# Patient Record
Sex: MALE | Race: WHITE
[De-identification: names, ages, dates, MRNs, and addresses within clinical notes are randomized per-mention and may not be internally consistent; named-entity substitution may affect disease eponyms.]

---

## 2023-08-04 PROBLEM — Z00.00 ENCOUNTER FOR PREVENTIVE HEALTH EXAMINATION: Status: ACTIVE | Noted: 2023-08-04

## 2023-08-08 ENCOUNTER — APPOINTMENT (OUTPATIENT)
Age: 33
End: 2023-08-08
Payer: COMMERCIAL

## 2023-08-08 VITALS
WEIGHT: 240 LBS | HEIGHT: 76 IN | DIASTOLIC BLOOD PRESSURE: 89 MMHG | BODY MASS INDEX: 29.22 KG/M2 | SYSTOLIC BLOOD PRESSURE: 142 MMHG | HEART RATE: 77 BPM

## 2023-08-08 DIAGNOSIS — Z78.9 OTHER SPECIFIED HEALTH STATUS: ICD-10-CM

## 2023-08-08 DIAGNOSIS — K40.90 UNILATERAL INGUINAL HERNIA, W/OUT OBSTRUCTION OR GANGRENE, NOT SPECIFIED AS RECURRENT: ICD-10-CM

## 2023-08-08 DIAGNOSIS — Z80.9 FAMILY HISTORY OF MALIGNANT NEOPLASM, UNSPECIFIED: ICD-10-CM

## 2023-08-08 PROCEDURE — 99203 OFFICE O/P NEW LOW 30 MIN: CPT

## 2023-08-10 LAB
ALBUMIN SERPL ELPH-MCNC: 4.8 G/DL
ALP BLD-CCNC: 50 U/L
ALT SERPL-CCNC: 39 U/L
ANION GAP SERPL CALC-SCNC: 20 MMOL/L
APTT BLD: 32.5 SEC
AST SERPL-CCNC: 21 U/L
BILIRUB SERPL-MCNC: 0.3 MG/DL
BUN SERPL-MCNC: 15 MG/DL
CALCIUM SERPL-MCNC: 10.1 MG/DL
CHLORIDE SERPL-SCNC: 100 MMOL/L
CO2 SERPL-SCNC: 23 MMOL/L
CREAT SERPL-MCNC: 0.94 MG/DL
EGFR: 110 ML/MIN/1.73M2
GLUCOSE SERPL-MCNC: 51 MG/DL
INR PPP: 0.95 RATIO
POTASSIUM SERPL-SCNC: 4.5 MMOL/L
PROT SERPL-MCNC: 7 G/DL
PT BLD: 10.7 SEC
SODIUM SERPL-SCNC: 143 MMOL/L

## 2023-08-14 PROBLEM — Z78.9 RARELY CONSUMES ALCOHOL: Status: ACTIVE | Noted: 2023-08-08

## 2023-08-14 NOTE — PHYSICAL EXAM
[Exam Deferred] : exam was deferred [Normal Breath Sounds] : Normal breath sounds [Normal Heart Sounds] : normal heart sounds [Normal Rate and Rhythm] : normal rate and rhythm [Alert] : alert [Oriented to Person] : oriented to person [Oriented to Place] : oriented to place [Oriented to Time] : oriented to time [de-identified] : Left inguinal hernia palpated, unable to palpate inguinal hernia on right side.  [JVD] : no jugular venous distention  [Wheezing] : no wheezing was heard [No Rash or Lesion] : No rash or lesion [de-identified] : soft, non-distended, non-tender to palpation.  [de-identified] : awake, alert, NAD  [de-identified] : normocephalic, atraumatic, EOMI, normal conjunctiva  [de-identified] : b/l chest rise, EWOB on RA  [de-identified] : Left inguinal bulge and hernia palpated, no palpable R inguinal hernia on coughing [de-identified] : Normal strength  [de-identified] : normal mood and affect

## 2023-08-14 NOTE — ASSESSMENT
[FreeTextEntry1] : Mr. RILEY ZUÑIGA is a healthy 33 year M presenting with left inguinal hernia here for an initial visit.

## 2023-08-14 NOTE — REVIEW OF SYSTEMS
[As Noted in HPI] : as noted in HPI [Negative] : Integumentary [Fever] : no fever [Chills] : no chills [Feeling Poorly] : not feeling poorly [Feeling Tired] : not feeling tired [Recent Weight Loss (___ Lbs)] : no recent weight loss [Eye Pain] : no eye pain [Earache] : no earache [Chest Pain] : no chest pain [Shortness Of Breath] : no shortness of breath [Abdominal Pain] : no abdominal pain [Vomiting] : no vomiting [Constipation] : no constipation [Diarrhea] : no diarrhea [FreeTextEntry7] : left inguinal hernia

## 2023-08-14 NOTE — HISTORY OF PRESENT ILLNESS
[FreeTextEntry1] : Mr. RILEY ZUÑIGA is a healthy 33 year M presenting with left inguinal hernia here for an initial visit. Patient works in construction which involve heavy lifting. He first noticed the inguinal hernia about 1yr ago and states it comes and goes. However, a couple of weeks ago, for the first time he noticed pain with it bulging but the pain resolved.  Per patient, hernia has not grown in size. Denies abdominal pain, nausea, vomiting blood in stools, dysuria, fevers/chills or skin changes. Has never had a colonoscopy.

## 2023-08-16 ENCOUNTER — OUTPATIENT (OUTPATIENT)
Dept: OUTPATIENT SERVICES | Facility: HOSPITAL | Age: 33
LOS: 1 days | End: 2023-08-16
Payer: COMMERCIAL

## 2023-08-16 VITALS
HEART RATE: 81 BPM | TEMPERATURE: 97 F | RESPIRATION RATE: 18 BRPM | OXYGEN SATURATION: 100 % | SYSTOLIC BLOOD PRESSURE: 137 MMHG | WEIGHT: 240.08 LBS | DIASTOLIC BLOOD PRESSURE: 80 MMHG | HEIGHT: 76 IN

## 2023-08-16 DIAGNOSIS — K40.90 UNILATERAL INGUINAL HERNIA, WITHOUT OBSTRUCTION OR GANGRENE, NOT SPECIFIED AS RECURRENT: ICD-10-CM

## 2023-08-16 DIAGNOSIS — Z87.81 PERSONAL HISTORY OF (HEALED) TRAUMATIC FRACTURE: Chronic | ICD-10-CM

## 2023-08-16 DIAGNOSIS — Z01.818 ENCOUNTER FOR OTHER PREPROCEDURAL EXAMINATION: ICD-10-CM

## 2023-08-16 RX ORDER — SODIUM CHLORIDE 9 MG/ML
3 INJECTION INTRAMUSCULAR; INTRAVENOUS; SUBCUTANEOUS EVERY 8 HOURS
Refills: 0 | Status: DISCONTINUED | OUTPATIENT
Start: 2023-08-21 | End: 2023-08-21

## 2023-08-16 NOTE — H&P PST ADULT - NS ATTEND AMEND GEN_ALL_CORE FT
OR today for open left inguinal hernia repair with mesh  Left side marked  ancef for abx  supine w/ arms out  general anesthesia  0.5% marcaine and exparel 1:1    Delaney Chapman MD  Attending Physician

## 2023-08-16 NOTE — H&P PST ADULT - PROBLEM SELECTOR PLAN 1
Patient scheduled for left inguinal hernia repair with mesh on 8/21/23. Preop instructions given both verbal and written,  instructed to be NPO the night before and the morning of surgery. Provided with chlorhexidine 4% solution to wash for 3 days including the morning of surgery, bottle provided .Instructed to avoid aspirin and over the counter medications including vitamins and herbal medications one week before surgery. Patient verbalized understanding. Escort required  Stop bang score is 1 , patient low risk for MANDEEP.  Blood work and EKG results pending, obtain from a PCP please.

## 2023-08-16 NOTE — H&P PST ADULT - ASSESSMENT
33 years old male with no significant PMH presents to [facility] today for presurgical evaluation. Patient with left inguinal hernia scheduled for repair with mesh on 8/21/23. Pat seen by a PCP on 7/31/23, blood work and EKG done.

## 2023-08-16 NOTE — H&P PST ADULT - PROBLEM/PLAN-1
Refill request from the patient for Voltaren Gel     Last office visit: 3/20/18  Last refill: 9-12-17      Medication and pharmacy loaded pending MD approval.   Please verify medication and dosage  Ok to refill? Please advise.     DISPLAY PLAN FREE TEXT

## 2023-08-16 NOTE — H&P PST ADULT - HISTORY OF PRESENT ILLNESS
33 years old male with no significant PMH presents to [facility] today for presurgical evaluation. Patient with left inguinal hernia scheduled for repair with mesh on 8/21/23.

## 2023-08-17 PROCEDURE — G0463: CPT

## 2023-08-20 ENCOUNTER — TRANSCRIPTION ENCOUNTER (OUTPATIENT)
Age: 33
End: 2023-08-20

## 2023-08-21 ENCOUNTER — TRANSCRIPTION ENCOUNTER (OUTPATIENT)
Age: 33
End: 2023-08-21

## 2023-08-21 ENCOUNTER — APPOINTMENT (OUTPATIENT)
Dept: SURGERY | Facility: HOSPITAL | Age: 33
End: 2023-08-21
Payer: COMMERCIAL

## 2023-08-21 ENCOUNTER — RESULT REVIEW (OUTPATIENT)
Age: 33
End: 2023-08-21

## 2023-08-21 ENCOUNTER — OUTPATIENT (OUTPATIENT)
Dept: OUTPATIENT SERVICES | Facility: HOSPITAL | Age: 33
LOS: 1 days | End: 2023-08-21
Payer: COMMERCIAL

## 2023-08-21 VITALS
HEART RATE: 63 BPM | SYSTOLIC BLOOD PRESSURE: 109 MMHG | DIASTOLIC BLOOD PRESSURE: 62 MMHG | RESPIRATION RATE: 16 BRPM | OXYGEN SATURATION: 96 % | TEMPERATURE: 98 F

## 2023-08-21 VITALS
HEIGHT: 76 IN | TEMPERATURE: 98 F | OXYGEN SATURATION: 98 % | WEIGHT: 240.08 LBS | HEART RATE: 76 BPM | SYSTOLIC BLOOD PRESSURE: 130 MMHG | RESPIRATION RATE: 16 BRPM | DIASTOLIC BLOOD PRESSURE: 85 MMHG

## 2023-08-21 DIAGNOSIS — K40.90 UNILATERAL INGUINAL HERNIA, WITHOUT OBSTRUCTION OR GANGRENE, NOT SPECIFIED AS RECURRENT: ICD-10-CM

## 2023-08-21 DIAGNOSIS — Z87.81 PERSONAL HISTORY OF (HEALED) TRAUMATIC FRACTURE: Chronic | ICD-10-CM

## 2023-08-21 PROCEDURE — 49505 PRP I/HERN INIT REDUC >5 YR: CPT | Mod: LT

## 2023-08-21 PROCEDURE — 88302 TISSUE EXAM BY PATHOLOGIST: CPT

## 2023-08-21 PROCEDURE — C1781: CPT

## 2023-08-21 PROCEDURE — 49505 PRP I/HERN INIT REDUC >5 YR: CPT | Mod: AS,LT

## 2023-08-21 PROCEDURE — 88302 TISSUE EXAM BY PATHOLOGIST: CPT | Mod: 26

## 2023-08-21 DEVICE — MESH HERNIA PERFIX PLUG MEDIUM 1.3 X 1.55": Type: IMPLANTABLE DEVICE | Site: LEFT INGUINAL HERNIA REPAIR WITH MESH | Status: FUNCTIONAL

## 2023-08-21 RX ORDER — OXYCODONE HYDROCHLORIDE 5 MG/1
1 TABLET ORAL
Qty: 5 | Refills: 0
Start: 2023-08-21

## 2023-08-21 RX ORDER — FENTANYL CITRATE 50 UG/ML
25 INJECTION INTRAVENOUS
Refills: 0 | Status: DISCONTINUED | OUTPATIENT
Start: 2023-08-21 | End: 2023-08-21

## 2023-08-21 RX ORDER — OXYCODONE HYDROCHLORIDE 5 MG/1
5 TABLET ORAL EVERY 4 HOURS
Refills: 0 | Status: DISCONTINUED | OUTPATIENT
Start: 2023-08-21 | End: 2023-08-21

## 2023-08-21 RX ORDER — METOCLOPRAMIDE HCL 10 MG
10 TABLET ORAL ONCE
Refills: 0 | Status: DISCONTINUED | OUTPATIENT
Start: 2023-08-21 | End: 2023-08-21

## 2023-08-21 RX ORDER — FENTANYL CITRATE 50 UG/ML
50 INJECTION INTRAVENOUS
Refills: 0 | Status: DISCONTINUED | OUTPATIENT
Start: 2023-08-21 | End: 2023-08-21

## 2023-08-21 RX ADMIN — SODIUM CHLORIDE 3 MILLILITER(S): 9 INJECTION INTRAMUSCULAR; INTRAVENOUS; SUBCUTANEOUS at 09:15

## 2023-08-21 NOTE — ASU DISCHARGE PLAN (ADULT/PEDIATRIC) - NS MD DC FALL RISK RISK
For information on Fall & Injury Prevention, visit: [email or web address] OR  [email or web address] OR  https://www.cdc.gov/steadi/patient.html

## 2023-08-21 NOTE — ASU DISCHARGE PLAN (ADULT/PEDIATRIC) - CALL YOUR DOCTOR IF YOU HAVE ANY OF THE FOLLOWING:
Fever greater than (need to indicate Fahrenheit or Celsius) Bleeding that does not stop/Fever greater than (need to indicate Fahrenheit or Celsius)/Wound/Surgical Site with redness, or foul smelling discharge or pus

## 2023-08-21 NOTE — ASU DISCHARGE PLAN (ADULT/PEDIATRIC) - CARE PROVIDER_API CALL
Delaney Chapman  Colon/Rectal Surgery  [address] [place] [address]  Phone: [phone]  Fax: [phone]  Follow Up Time:

## 2023-08-21 NOTE — BRIEF OPERATIVE NOTE - NSICDXBRIEFPROCEDURE_GEN_ALL_CORE_FT
PROCEDURES:  Open repair of inguinal hernia using mesh in adult 21-Aug-2023 11:51:10  Corey Kauffman

## 2023-08-21 NOTE — ASU PATIENT PROFILE, ADULT - FALL HARM RISK - UNIVERSAL INTERVENTIONS
Bed in lowest position, wheels locked, appropriate side rails in place/Call bell, personal items and telephone in reach/Instruct patient to call for assistance before getting out of bed or chair/Non-slip footwear when patient is out of bed/[place] to call system/Physically safe environment - no spills, clutter or unnecessary equipment/Purposeful Proactive Rounding/Room/bathroom lighting operational, light cord in reach

## 2023-09-05 LAB — SURGICAL PATHOLOGY STUDY: SIGNIFICANT CHANGE UP

## 2023-09-12 ENCOUNTER — APPOINTMENT (OUTPATIENT)
Age: 33
End: 2023-09-12
Payer: COMMERCIAL

## 2023-09-12 VITALS
BODY MASS INDEX: 29.22 KG/M2 | SYSTOLIC BLOOD PRESSURE: 153 MMHG | HEIGHT: 76 IN | WEIGHT: 240 LBS | DIASTOLIC BLOOD PRESSURE: 81 MMHG | HEART RATE: 92 BPM

## 2023-09-12 PROCEDURE — 99024 POSTOP FOLLOW-UP VISIT: CPT

## 2023-09-12 PROCEDURE — 99213 OFFICE O/P EST LOW 20 MIN: CPT

## (undated) DEVICE — NDL HYPO SAFE 25G X 1.5" (ORANGE)

## (undated) DEVICE — PREP CHLORAPREP HI-LITE ORANGE 26ML

## (undated) DEVICE — FOR-ESU VALLEYLAB T7E15009DX: Type: DURABLE MEDICAL EQUIPMENT

## (undated) DEVICE — DRAPE LIGHT HANDLE COVER (BLUE)

## (undated) DEVICE — PACK MINOR NO DRAPE

## (undated) DEVICE — DRSG CURITY GAUZE SPONGE 4 X 4" 12-PLY

## (undated) DEVICE — SOL IRR POUR NS 0.9% 1500ML

## (undated) DEVICE — GLV 7 PROTEXIS (WHITE)

## (undated) DEVICE — ELCTR GROUNDING PAD ADULT COVIDIEN

## (undated) DEVICE — SUT PDS II 2-0 27" CT-2

## (undated) DEVICE — DRAIN PENROSE 5/8" X 18" LATEX

## (undated) DEVICE — VENODYNE/SCD SLEEVE CALF MEDIUM

## (undated) DEVICE — SUT POLYSORB 2-0 30" V-20 UNDYED

## (undated) DEVICE — DRSG MASTISOL

## (undated) DEVICE — SUT POLYSORB 4-0 27" P-12 UNDYED

## (undated) DEVICE — GLV 7.5 PROTEXIS (WHITE)

## (undated) DEVICE — SUT POLYSORB 2-0 18" TIES UNDYED

## (undated) DEVICE — DRAPE LAPAROTOMY W POUCHES

## (undated) DEVICE — SPONGE DISSECTOR PEANUT

## (undated) DEVICE — WARMING BLANKET UPPER ADULT

## (undated) DEVICE — DRSG TEGADERM 4X4.75"